# Patient Record
Sex: MALE | Race: WHITE
[De-identification: names, ages, dates, MRNs, and addresses within clinical notes are randomized per-mention and may not be internally consistent; named-entity substitution may affect disease eponyms.]

---

## 2017-07-24 ENCOUNTER — HOSPITAL ENCOUNTER (EMERGENCY)
Dept: HOSPITAL 61 - ER | Age: 32
Discharge: LEFT BEFORE DISCHARGE | End: 2017-07-24
Payer: SELF-PAY

## 2017-07-24 VITALS — BODY MASS INDEX: 23.8 KG/M2 | HEIGHT: 71 IN | WEIGHT: 170 LBS

## 2017-07-24 VITALS — DIASTOLIC BLOOD PRESSURE: 69 MMHG | SYSTOLIC BLOOD PRESSURE: 111 MMHG

## 2017-07-24 DIAGNOSIS — Z88.0: ICD-10-CM

## 2017-07-24 DIAGNOSIS — F15.10: ICD-10-CM

## 2017-07-24 DIAGNOSIS — L03.114: Primary | ICD-10-CM

## 2017-07-24 PROCEDURE — 99283 EMERGENCY DEPT VISIT LOW MDM: CPT

## 2017-07-24 NOTE — PHYS DOC
Past Medical History


Past Medical History:  No Pertinent History


Additional Past Medical Histor:  drug abuse


Past Surgical History:  No Surgical History


Additional Past Surgical Histo:  incision and drainage


Alcohol Use:  None


Drug Use:  Methamphetamine





Adult General


Chief Complaint


Chief Complaint:  DRUG ABUSE





[facility]


HPI





Patient is a 32  year old male presents to the emergency department stating 

that 3 days ago he tried to shoot up methamphetamines in his left wrist. 

Patient states that he missed and had swelling. He continues to have swelling 

for the last 3 days with increased pain and discomfort. He has swelling noted 

into the hand up into the forearm. No apparent redness is noted however the 

area just felt hot. The area doesn't feel warm and tender. No drainage or 

discharge noted from site.





Review of Systems


Review of Systems





Constitutional: Denies fever or chills []


Eyes: Denies change in visual acuity, redness, or eye pain []


HENT: Denies nasal congestion or sore throat []


Respiratory: Denies cough or shortness of breath []


Cardiovascular: No additional information not addressed in HPI []


GI: Denies abdominal pain, nausea, vomiting, bloody stools or diarrhea []


: Denies dysuria or hematuria []


Musculoskeletal: Denies back pain. Left wrist swelling into the hand.


Integument: Denies rash or skin lesions []


Neurologic: Denies headache, focal weakness or sensory changes []


Endocrine: Denies polyuria or polydipsia []





Allergies


Allergies





Allergies








Coded Allergies Type Severity Reaction Last Updated Verified


 


  Penicillins Allergy Intermediate Rash 10/25/16 Yes











Physical Exam


Physical Exam





Constitutional: Well developed, well nourished, no acute distress, non-toxic 

appearance. []


HENT: Normocephalic, atraumatic, bilateral external ears normal, oropharynx 

moist, no oral exudates, nose normal. []


Eyes: PERRLA, EOMI, conjunctiva normal, no discharge. [] 


Neck: Normal range of motion, no tenderness, supple, no stridor. [] 


Cardiovascular:Heart rate regular rhythm, no murmur []


Lungs & Thorax:  Bilateral breath sounds clear to auscultation []


Abdomen: Bowel sounds normal, soft, no tenderness, no masses, no pulsatile 

masses. [] 


Skin: Warm, dry, no erythema, no rash. [] 


Back: No tenderness


Extremities: Left wrist and hand tenderness, patient with swelling and 

tenderness noted no redness noted however there is warmth. Radial pulses 2+ cap 

refill brisk less than 2 seconds. Patient has decreased range of motion of the 

fingers and thumb however he is able to move them. He does have decreased range 

of motion to the wrist due to inflammation and swelling. No cyanosis, no 

clubbing, ROM intact, no edema. [] 


Neurologic: Alert and oriented X 3, normal motor function, normal sensory 

function, no focal deficits noted. []


Psychologic: Affect normal, judgement normal, mood normal. []





Current Patient Data


Vital Signs





 Vital Signs








  Date Time  Temp Pulse Resp B/P (MAP) Pulse Ox O2 Delivery O2 Flow Rate FiO2


 


7/24/17 20:04 98.2 85 18  98 Room Air  





 98.2       











EKG


EKG


[]





Radiology/Procedures


Radiology/Procedures


[]





Course & Med Decision Making


Course & Med Decision Making


Pertinent Labs and Imaging studies reviewed. (See chart for details)


Due to the patient's frequent IV drug usage and extremities. It is very 

difficult for the nursing staff to be able to obtain IV access as well as 

obtaining blood samples. Patient has refused to allow any further IV sticks at 

this time. Have offered the patient to have a jugular vein access in which 

patient refuses at this time and states that he has never shot up and the neck 

and he is not going to start that now. Spoke with patient in regards to needing 

to have IV antibiotics as the hand is completely swollen and increased pain and 

tenderness. Patient continues to refuse to have further IV access attempted. 

Patient will be leaving AGAINST MEDICAL ADVICE. He was informed that he has a 

high chance of becoming septic, or losing his arm completely. Patient states 

that he still does not want to have any IV access. I we'll provide him with 

clindamycin at discharge however he has been informed that I do not feel that 

this is going to be appropriate for his treatment regimen.


[]





Dragon Disclaimer


Dragon Disclaimer


This electronic medical record was generated, in whole or in part, using a 

voice recognition dictation system.





Departure


Departure


Impression:  


 Primary Impression:  


 Left against medical advice


 Additional Impression:  


 Cellulitis of left arm


Disposition:  07 AGAINST MEDICAL ADVICE


Condition:  STABLE


Referrals:  


NO PCP (PCP)


Patient Instructions:  Cellulitis, Easy-to-Read, Discharge Against Medical 

Advice





Additional Instructions:  


You have chosen to leave against medical advise


You have been instructed that you have the possibility of sepsis, loss of the 

left arm or even death.


However you have been provided with antibiotics return to the emergency 

department if the area becomes increasing redness, tender or inability to use 

the left arm


Scripts


Clindamycin Hcl (CLINDAMYCIN HCL) 150 Mg Capsule


3 CAP PO TID for 10 Days, CAP


   Prov: ARCADIO SANCHEZ         7/24/17





Problem Qualifiers











ARCADIO SANCHEZ Jul 24, 2017 20:18

## 2020-08-03 ENCOUNTER — HOSPITAL ENCOUNTER (EMERGENCY)
Dept: HOSPITAL 61 - ER | Age: 35
Discharge: HOME | End: 2020-08-03
Payer: SELF-PAY

## 2020-08-03 VITALS — BODY MASS INDEX: 23.77 KG/M2 | HEIGHT: 71 IN | WEIGHT: 169.76 LBS

## 2020-08-03 VITALS — DIASTOLIC BLOOD PRESSURE: 83 MMHG | SYSTOLIC BLOOD PRESSURE: 128 MMHG

## 2020-08-03 DIAGNOSIS — F19.90: ICD-10-CM

## 2020-08-03 DIAGNOSIS — F17.200: ICD-10-CM

## 2020-08-03 DIAGNOSIS — Z88.0: ICD-10-CM

## 2020-08-03 DIAGNOSIS — K02.7: Primary | ICD-10-CM

## 2020-08-03 DIAGNOSIS — K08.89: ICD-10-CM

## 2020-08-03 DIAGNOSIS — Z98.890: ICD-10-CM

## 2020-08-03 PROCEDURE — 99283 EMERGENCY DEPT VISIT LOW MDM: CPT

## 2020-08-03 NOTE — PHYS DOC
Past Medical History


Past Medical History:  No Pertinent History


Additional Past Medical Histor:  drug abuse


Past Surgical History:  No Surgical History


Additional Past Surgical Histo:  incision and drainage


Smoking Status:  Current Every Day Smoker


Alcohol Use:  None


Drug Use:  Methamphetamine





General Adult


EDM:


Chief Complaint:  TOOTH ACHE OR PAIN





HPI:


HPI:





Patient is a 35  year old male who presents to the emergency department with 

complaints of right upper posterior dental pain that has intensified over the 

last four days. Patient reports that the affected tooth has been painful and has

slowly been breaking off for the past month. He denies any fever, sore throat, 

difficulty swallowing, cough, shortness of breath, nausea, vomiting, or 

abdominal pain. Patient states that the dental pain is causing him to have a 

decreased appetite because the pain increases with eating. He reports that the 

pain radiates to his right ear, he denies any decreased hearing ringing in his 

ear. He currently he rates the pain a 10 out of 10 on the pain scale, he denies 

any alleviating factors, he is tried taking Tylenol with no relief of his pain. 

The pain is worse with palpation of the tooth and with eating.





Review of Systems:


Review of Systems:


Constitutional:   Denies fever or chills. []


Eyes:   Denies change in visual acuity. []


HENT:   Denies nasal congestion or sore throat. [] 


Respiratory:   Denies cough or shortness of breath. [] 


Cardiovascular:   Denies chest pain or edema. [] 


GI:   Denies abdominal pain, nausea, vomiting, bloody stools or diarrhea. [] 


:  Denies dysuria. [] 


Musculoskeletal:   Denies back pain or joint pain. [] 


Integument:   Denies rash. [] 


Neurologic:   Denies headache, focal weakness or sensory changes. [] 


Endocrine:   Denies polyuria or polydipsia. [] 


Lymphatic:  Denies swollen glands. [] 


Psychiatric:  Denies depression or anxiety. []





Heart Score:


Risk Factors:


Risk Factors:  DM, Current or recent (<one month) smoker, HTN, HLP, family histo

ry of CAD, obesity.


Risk Scores:


Score 0 - 3:  2.5% MACE over next 6 weeks - Discharge Home


Score 4 - 6:  20.3% MACE over next 6 weeks - Admit for Clinical Observation


Score 7 - 10:  72.7% MACE over next 6 weeks - Early Invasive Strategies





Allergies:


Allergies:





Allergies








Coded Allergies Type Severity Reaction Last Updated Verified


 


  Penicillins Allergy Intermediate Rash 10/25/16 Yes











Physical Exam:


PE:





Constitutional: Well developed, well nourished, no acute distress, non-toxic 

appearance. []


HENT: Normocephalic, atraumatic, bilateral external ears normal, bilateral TMs 

normal, posterior pharynx normal, oropharynx moist, nose normal; diffuse dental 

decay in the posterior right upper quadrant with no visible or palpable dental 

abscess, mild gingival erythema


Eyes: PERRLA, EOMI, conjunctiva normal, no discharge. [] 


Neck: Normal range of motion, no tenderness, supple, no stridor. [] 


Cardiovascular:Heart rate regular rhythm


Lungs & Thorax:  respirations even and unlabored, no retractions, no distress, 

lungs CTA


Skin: Warm, dry, no erythema, no rash. [] 


Extremities: No cyanosis, ROM intact, no edema. [] 


Neurologic: Alert and oriented X 3, no focal deficits noted. []


Psychologic: Affect normal, judgement normal, mood agitated





Current Patient Data:


Vital Signs:





                                   Vital Signs








  Date Time  Temp Pulse Resp B/P (MAP) Pulse Ox O2 Delivery O2 Flow Rate FiO2


 


8/3/20 20:50 98.0 84 16 128/83 (98) 99 Room Air  





 98.0       











EKG:


EKG:


[]





Radiology/Procedures:


Radiology/Procedures:


[]





Course & Med Decision Making:


Course & Med Decision Making


Pertinent Labs and Imaging studies reviewed. (See chart for details)


35-year-old male presents the emergency department with complaints of right 

upper quadrant dental pain for the last month has increased over the last four 

days


I advised the patient that I do not prescribed narcotic pain medication for 

dental pain, I will give patient one hydrocodone 5/325 mg tablet while he's in 

the emergency department. Prescriptions will be written for clindamycin and 

naproxen. I will also provide the patient with a list of local dentists to foll

ow-up with so he can have his dental decay taking care of.





Dragon Disclaimer:


Dragon Disclaimer:


This electronic medical record was generated, in whole or in part, using a voice

 recognition dictation system.





Departure


Departure


Impression:  


   Primary Impression:  


   Infected dental caries


   Additional Impression:  


   Dentalgia


Disposition:  01 HOME, SELF-CARE


Condition:  STABLE


Referrals:  


NO PCP (PCP)


Patient Instructions:  Dental Caries, Dental Pain, Easy-to-Read





Additional Instructions:  


Fill prescription(s) and use as directed. Follow up with dentist using the 

referral list provided. Return to the ER if symptoms worsen.


Scripts


Naproxen (NAPROXEN) 500 Mg Tablet


1 TAB PO BID PRN for PAIN for 10 Days, #20 TAB 0 Refills


   Prov: ERIC VILLANUEVA         8/3/20 


Clindamycin Hcl (CLINDAMYCIN HCL) 150 Mg Capsule


300 MG PO TID for 7 Days, #42 CAP 0 Refills


   Prov: ERIC VILLANUEVA         8/3/20





Justicifation of Admission Dx:


Justifications for Admission:


Justification of Admission Dx:  N/A











ERIC VILLANUEVA        Aug 3, 2020 21:58